# Patient Record
Sex: MALE | Race: WHITE
[De-identification: names, ages, dates, MRNs, and addresses within clinical notes are randomized per-mention and may not be internally consistent; named-entity substitution may affect disease eponyms.]

---

## 2018-03-10 ENCOUNTER — HOSPITAL ENCOUNTER (EMERGENCY)
Dept: HOSPITAL 68 - ERH | Age: 83
End: 2018-03-10
Payer: COMMERCIAL

## 2018-03-10 VITALS — WEIGHT: 210 LBS | BODY MASS INDEX: 31.1 KG/M2 | HEIGHT: 69 IN

## 2018-03-10 VITALS — SYSTOLIC BLOOD PRESSURE: 107 MMHG | DIASTOLIC BLOOD PRESSURE: 62 MMHG

## 2018-03-10 DIAGNOSIS — M51.36: ICD-10-CM

## 2018-03-10 DIAGNOSIS — M54.5: Primary | ICD-10-CM

## 2018-03-10 DIAGNOSIS — M48.061: ICD-10-CM

## 2018-03-10 NOTE — ED NECK/BACK PAIN COMPLAINT
History of Present Illness
 
General
Chief Complaint: Low Back Pain/Injury
Stated Complaint: INTENSE BACK PAIN
Source: patient
Exam Limitations: no limitations
 
Vital Signs & Intake/Output
Vital Signs & Intake/Output
 Vital Signs
 
 
Date Time Temp Pulse Resp B/P B/P Pulse O2 O2 Flow FiO2
 
     Mean Ox Delivery Rate 
 
03/10 0138 97.4 68 20 146/97  96 Room Air  
 
 
 
Allergies
Coded Allergies:
MDX - Pneumococcal Vaccine (PNEUMOCOCCAL VACCINE) (Intermediate, SWELLING )
 
Reconcile Medications
Cyclobenzaprine HCl 10 MG TABLET   1 TAB PO 4 TIMES/DAY PRN MUSCLE SPASM
Tylenol With Codeine (Tylenol With Codeine #3 Tablet) 300 MG-30 MG TABLET   1-2 
TAB PO TID PRN pain
     ten...[id number]
 
Triage Note:
PT BIBA FROM HOME C/O LOWER BACK PAIN X 2 DAYS.
 DENIES INJURY, FALL, HEAVY EXERTION. DENIES
 N/V/URINARY S/SX. PT HAS BEEN TAKING TYLENOL
 WITHOUT RELIEF. STATES "I CAN USUALLY GET
 COMFORTABLE, BUT TONIGHT I COULDNT." PT AMBULATED
 OFF EMS STRETCHER TO ER STRETCHER WITH STEADY
 GAIT.
Triage Nurses Notes Reviewed? yes
Onset: Abrupt
Duration: day(s):
Timing: recent history
Quality/Severity: moderate
Location: lumbar spine
Radiation: none
Context: "I was at home and it really began to hurt."
Method of Injury: unknown
Loss of Consciousness: no loss of consciousness
Modifying Factors: movement, rest
Associated Symptoms: muscle spasm
HPI:
82 yo gentleman
h/o htn and "bleeding ulcers"
presents with lumbar back pain x 2-3 days.
 
He notes no injury.  He notes it hurts with minimal movement.  "I keep still and
it only hurts a little, and then when I move it just hurts so much."
 
No numbness, tingling, lower extremity weakness.  He is otherwise well. 
 
Past History
 
Travel History
Traveled to [place] past 21 day No
 
Medical History
Any Pertinent Medical History? see below for history
Neurological: NONE
EENT: NONE
Cardiovascular: hypertension
Respiratory: NONE
Gastrointestinal: ULCERS
Hepatic: NONE
Renal: NONE
Musculoskeletal: NONE
Psychiatric: NONE
Endocrine: NONE
Blood Disorders: NONE
Cancer(s): NONE
GYN/Reproductive: NONE
History of MRSA: No
History of VRE: No
History of CDIFF: No
Pneumonia Vaccine: 14
Influenza Vaccine: 11/10/13
 
Surgical History
Surgical History: none
 
Psychosocial History
Who do you live with Patient/Self
What is your primary language English
Tobacco Use: Quit >30 days ago
ETOH Use: heavy use
Illicit Drug Use: denies illicit drug use
 
Family History
Family History, If Any:
Relation not specified for:
  FH: hypertension
 
Hx Contributory? No
 
Review of Systems
 
Review of Systems
Constitutional:
Reports: no symptoms. 
Eyes:
Reports: no symptoms. 
Ears, Nose, Throat, Mouth:
Reports: no symptoms. 
Respiratory:
Reports: no symptoms. 
Cardiovascular:
Reports: no symptoms. 
Gastrointestinal/Abdominal:
Reports: no symptoms. 
Musculoskeletal:
Reports: no symptoms. 
Skin:
Reports: no symptoms. 
Neurological/Psychological:
Reports: no symptoms. 
All Other Systems: Reviewed and Negative
 
Physical Exam
 
Physical Exam
General Appearance: well developed/nourished, mild distress
Head: atraumatic
Eyes:
Bilateral: PERRL, EOMI. 
Ears, Nose, Throat, Mouth: hearing grossly normal
Neck: normal inspection, supple, full range of motion, normal alignment
Respiratory: normal breath sounds
Cardiovascular: regular rate/rhythm
Gastrointestinal: soft, non-tender
Back: normal inspection, muscle spasm, no vertebral tenderness, muscle spasm and
tenderness in lower lumbar spine. no focal bony tenderness. 
Extremities: normal range of motion
Neurologic/Psych: awake, alert, oriented x 3, normal mood/affect
Skin: intact, normal color, warm/dry
Comments:
negative straight leg raise bilaterally
light touch, strength, dtr's are symmetrical and normal bilaterally. 
 
Core Measures
CVA/TIA Diagnosis: No
 
Progress
Differential Diagnosis: herniated disc, myofascial strain, sciatica, T/L spine 
injury
Plan of Care:
 Current Medications
 
 
  Sig/Rox Start time  Last
 
Medication Dose  Stop Time Status Admin
 
Hydromorphone HCl 1 MG ONCE ONE 03/10 0345 UNVr 03/10
 
(Dilaudid)   03/10 0346  0343
 
 
 
Diagnostic Imaging:
Viewed by Me: CT Scan.  Discussed w/RAD: CT Scan. 
Radiology Impression: PATIENT: JUVENTINO LUCAS  MEDICAL RECORD NO: [id number] 
PRESENT AGE: 83  PATIENT ACCOUNT NO: [id number] : 34  LOCATION: [facility] 
ORDERING PHYSICIAN: Luis Carlos Simms MD     SERVICE DATE: 03/10/ 
EXAM TYPE: CAT - CT LUMB SPINE WO IV CONTRAST EXAMINATION: CT LUMBAR SPINE 
WITHOUT CONTRAST CLINICAL INFORMATION: Severe pain COMPARISON: None TECHNIQUE: 
Helical non-contrast CT images were obtained through the lumbar spine and 1.25 
and 2.5 mm axial reconstructions were reviewed along with sagittal and coronal 
MPRs. DLP: 1419.69 mGy-cm FINDINGS: There is anatomic alignment of the lumbar 
vertebral bodies and posterior elements. Vertebral body heights are maintained. 
There is mild disc space narrowing at L5-S1 with endplate irregularity. The 
remainder of the intervertebral disc spaces are relatively well-preserved. 
Multilevel endplate osteophytes are present throughout the spine, and there is 
multilevel facet arthropathy. No acute fracture is seen. The sacroiliac joints 
are maintained. Moderate central stenosis is suspected at L4-L5, as well as a 
possible right foraminal stenosis. Mild central stenoses are suspected at L2-L3 
and L3-L4. There is pleural calcification in the basilar right hemithorax. There
is a partially visualized low-density right renal lesion favored to represent a 
cyst. There is atherosclerotic calcification along the aorta. IMPRESSION: 
Multilevel degenerative changes of the lumbar spine, with no acute osseous 
findings identified. Suspect moderate central stenosis at L4-L5 and possible 
right foraminal stenosis; this may be better assessed with MRI. DICTATED BY: 
Ferny Ramirez MD  DATE/TIME DICTATED:03/10/18 / 0323 :[facility] 
DATE/TIME TRANSCRIBED:03/10/18 / 0323 CONFIDENTIAL, DO NOT COPY WITHOUT 
APPROPRIATE AUTHORIZATION.  <Electronically signed in Other Vendor System>      
                                                                                
SIGNED BY: Ferny Ramirez MD 03/10/18 0336
 
Departure
 
Departure
Disposition: HOME OR SELF CARE
Condition: Stable
Clinical Impression
Primary Impression: Back pain
Secondary Impressions: DJD (degenerative joint disease), Spinal stenosis
Referrals:
Marie FELTON,Duc RUIZ (PCP/Family)
 
Departure Forms:
Customer Survey
General Discharge Information
Prescriptions:
Current Visit Scripts
Tylenol With Codeine (Tylenol With Codeine #3 Tablet) 1-2 TAB PO TID PRN pain 
     #10 TAB 
     ten...[id number]
 
Cyclobenzaprine HCl 1 TAB PO 4 TIMES/DAY PRN MUSCLE SPASM 
     #30 TAB Ref 1
 
 
Comments
3/10/18, 4:03am... pt is improved after supportive medications... safe for 
discharge... close follow up with ortho, pmd for referral to PT advised.

## 2024-04-04 NOTE — CT SCAN REPORT
EXAMINATION:
CT LUMBAR SPINE WITHOUT CONTRAST
 
CLINICAL INFORMATION:
Severe pain
 
COMPARISON:
None
 
TECHNIQUE:
Helical non-contrast CT images were obtained through the lumbar spine and
1.25 and 2.5 mm axial reconstructions were reviewed along with sagittal and
coronal MPRs.
 
DLP:
1419.69 mGy-cm
 
FINDINGS:
There is anatomic alignment of the lumbar vertebral bodies and posterior
elements. Vertebral body heights are maintained. There is mild disc space
narrowing at L5-S1 with endplate irregularity. The remainder of the
intervertebral disc spaces are relatively well-preserved. Multilevel endplate
osteophytes are present throughout the spine, and there is multilevel facet
arthropathy. No acute fracture is seen. The sacroiliac joints are maintained.
 
Moderate central stenosis is suspected at L4-L5, as well as a possible right
foraminal stenosis. Mild central stenoses are suspected at L2-L3 and L3-L4.
 
There is pleural calcification in the basilar right hemithorax. There is a
partially visualized low-density right renal lesion favored to represent a
cyst. There is atherosclerotic calcification along the aorta.
 
IMPRESSION:
Multilevel degenerative changes of the lumbar spine, with no acute osseous
findings identified. Suspect moderate central stenosis at L4-L5 and possible
right foraminal stenosis; this may be better assessed with MRI. none